# Patient Record
(demographics unavailable — no encounter records)

---

## 2025-04-07 NOTE — REVIEW OF SYSTEMS
[Constipation] : constipation [Joint Pain] : joint pain [Muscle Pain] : muscle pain [Dizziness] : dizziness [Fever] : no fever [Chills] : no chills [Night Sweats] : no night sweats [Chest Pain] : no chest pain [Abdominal Pain] : no abdominal pain [Shortness Of Breath] : no shortness of breath [Vomiting] : no vomiting [Skin Rash] : no skin rash [Easy Bleeding] : no tendency for easy bleeding [Easy Bruising] : no tendency for easy bruising [FreeTextEntry8] : urinary retention  [FreeTextEntry9] : right shoulder pain with moving shoulder over head.

## 2025-04-07 NOTE — ASSESSMENT
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 diff colitis, subsequently lost to follow up. PET scan in March 2022 showed findings concerning for relapse, specifically in the nasopharyngeal mucosa and cervical LNs. Patient was asymptomatic, and was followed up with serial CT scans q3 months which were stable with fairly non-bulky (although also showed small lung nodules) adenopathy in the neck. After 6 months biopsy deemed unnecessary at this time.  Patient clinically seems to be doing well, no systemic constitutional symptoms indicative of relapse. Repeat imaging done 4/5/24 and showed interval stability of lung nodules and no other changes indicative of relapse.  Overall, patient still debilitated and wheelchair bound for the most part, but neurogenic bladder has improved and infectious complications better. She is now walking with walker short distances. This has seemed to plateau and she hasn't made much progress more recently. Numbness and pain bothered her. Now following with neurologist.  Plan: -At this point, will avoid regular imaging unless otherwise clinically indicated. -Patient encouraged to follow up with neurologist. Continue supportive care with gabapentin and baclofen as per neurology.  Recommendation by neurology for physical therapy.  -CBC is stable today with persistent microcytic anemia. Had previously checked Hb electrophoresis which confirmed the presence of beta thal trait. No further management warranted. -Patient understands and agrees with plan. All information explained to the best of my ability. Discussed in detail with son Frank during appointment.  -RTC in 6 months for 1 final visit (5 year point).

## 2025-04-07 NOTE — HISTORY OF PRESENT ILLNESS
[de-identified] : On initial presentation this was a 69 y/o F previously with unremarkable medical history of HTN and hypothyroid, presented in summer 2020 with worsening lower back pain. Upon arrival to emergency room MRI of the Thoracic/lumbar spine which showed T10 lesion causing spinal cord compression measuring approximately 2.8x1.1x2.6 cm, as well as a small left para spinal soft tissue abnormality. Found to have pathologic compression fracture. On 8/3/20 patient underwent Thoracic T10 corpectomy and T8-T12 posterior arthrodesis with soft tissue removal, with fusion. Patient was started on Dexamethasone taper. Pathology consistent with high grade B cell Lymphoma, Myc/Bcl2 expressed, Ki 67>95%. On 8/7 patient had bone marrow biopsy which was negative for involvement. As per Neurosurgery Dr. Prasad, chemotherapy was held 2 weeks post operation.  On 8/17 patient was started on Cycle 1 of R-CHOP. Patient had mild facial flushing with Rituxan which resolved with Benadryl. On 8/19/20 patient had a dose of Neulasta. Patient discharged to Rehab at that point, and next cycle of Chemo was due on 9/8/20. Patient discharged with landers catheter and dexamethasone taper. Rehab c/b sepsis, UTI, fever, neutropenia. Patient suffered from septic shock due to Ecoli Bacteremia, and was treated in the MICU at [facility] with IV Rocephin. Ultimately improved and discharged back to rehab. She was unable to stay at rehab very long as she was continually spiking fevers for unknown reason. Course also complicated by multiple RRTs for tachycardia and obturator vein thrombosis, now on anticoagulation. She completed a course of meropenem and her blood counts recovered, and ultimately was transferred to [facility] 10/16/2020 for inpatient chemotherapy. She received cycle 2 of RCHOP on 10/12/2020. Discharged back to rehab after.  Patient with incomplete paraplegia, and worsening low back pain, and on transfer to rehab ultimately transferred to medical floor from acute rehab secondary to Covid PCR infection. Hospital course further prolonged/complicated by c diff colitis. Completed 14 day course of Vancomycin and 10 day course of Dificid, then started on a slow taper of PO Vancomycin. Viral PNA 2/2 to COVID 19 resolved s/p Remdesivir. Patient with bilateral lower extremity numbness and muscle spasms. CT TL spine without any significant abnormalities. Started on trial of Robaxin. Neurogenic bladder - chronic landers.  Since initial course patient had been completely lost to follow up until March 2022. They didn't want to continue with chemotherapy as she wasn't progressing well at her rehab facility and she was getting recurrent infections. However, she then had a PET scan in early March 2022 which showed findings concerning for possible DLBCL relapse. Patient had felt fine in the interval period, the only thing that bothered her is she still couldn't ambulate and/or stand for long periods. Neurogenic bladder improved, but she still required adult diapers (previously had catheter in place). She had interval CT scans in August 2022 which showed stability of previously noted lymph nodes.   Disease: DLBCL   Stage:. 1E.   Pathology: 1. Vertebral body tumor - High grade B-cell lymphoma.   Tumor/ Prognostic Markers: Immunohistochemical (CD3, CD5, CD10, CD20, CD21, CD23, CD30, BCL- 2, BCL-6, cyclinD1, ki-67, MUM-1, p53, PAX-5) and in situ hybridization (MARTHA, kappa, lambda) were performed on block 3B. The neoplastic cells are positive for CD20, PAX-5, BCL-6, MUM-1, p53, BCL2 (weak and partial), CD10 (focal); negative CD5, CD30, cyclinD1, MARTHA. Ki-67 proliferation index is greater than 95%. C-MYC shows no staining in these two parts. There are scattered T cells (CD3+, CD5+) in the background. CD21 and CD23 show no follicular dendritic meshwork. AMBER staining for kappa and lambda shows scattered polytypic plasma cells. MICAELA appears to stain some neoplastic cells as well.  FISH studies performed on block 1A show no evidence of MYC rearrangement, BCL2-IGH gene rearrangement, BCL6 breakpoint translocation or t(8;14).  In summary, the combined histological, immunohistochemical and genetic findings in the three parts are consistent with diffuse large B-cell lymphoma, germinal center B-cell subtype with high proliferation index.   Prognostics Scoring System: R-IPI Score: 2 [de-identified] : Patient seen for follow up on 04/07/2025. [employer]  used ([employer] [id number] [employer]) in the office today.  However, patient then quickly became angry about the  and requested us to remove . Subsequently there was communication deficit due to language, so son Frank was called in as .  She is still complaining of same pain in her lower extremities especially at night. She reported that its like clamping and pulsing especially at night, and its ever since she stopped one of the medicines. It was a medicine that helped with nerve pain and her general doctor asked her to stop due to it "not being good for her". She cannot elucidate to me which medication it was. Otherwise her appetite and weight have been completely stable. Denied any presence of night sweats.